# Patient Record
Sex: FEMALE | Race: WHITE | URBAN - METROPOLITAN AREA
[De-identification: names, ages, dates, MRNs, and addresses within clinical notes are randomized per-mention and may not be internally consistent; named-entity substitution may affect disease eponyms.]

---

## 2021-09-30 ENCOUNTER — TRANSCRIBE ORDER (OUTPATIENT)
Dept: SCHEDULING | Age: 31
End: 2021-09-30

## 2021-09-30 DIAGNOSIS — J01.41 ACUTE RECURRENT PANSINUSITIS: Primary | ICD-10-CM

## 2022-03-16 RX ORDER — LEVONORGESTREL 52 MG/1
1 INTRAUTERINE DEVICE INTRAUTERINE ONCE
COMMUNITY
Start: 2020-03-04 | End: 2028-03-04

## 2022-03-18 ENCOUNTER — OFFICE VISIT (OUTPATIENT)
Dept: OBGYN CLINIC | Age: 32
End: 2022-03-18
Payer: COMMERCIAL

## 2022-03-18 VITALS
HEIGHT: 65 IN | BODY MASS INDEX: 27.16 KG/M2 | SYSTOLIC BLOOD PRESSURE: 138 MMHG | WEIGHT: 163 LBS | DIASTOLIC BLOOD PRESSURE: 76 MMHG

## 2022-03-18 DIAGNOSIS — Z20.2 POSSIBLE EXPOSURE TO STD: ICD-10-CM

## 2022-03-18 DIAGNOSIS — R10.32 LLQ PAIN: Primary | ICD-10-CM

## 2022-03-18 PROCEDURE — 99203 OFFICE O/P NEW LOW 30 MIN: CPT | Performed by: OBSTETRICS & GYNECOLOGY

## 2022-03-18 RX ORDER — VALACYCLOVIR HYDROCHLORIDE 1 G/1
1000 TABLET, FILM COATED ORAL 2 TIMES DAILY
Qty: 20 TABLET | Refills: 2 | Status: SHIPPED | OUTPATIENT
Start: 2022-03-18 | End: 2022-03-28

## 2022-03-18 NOTE — PROGRESS NOTES
Problem Visit-Complete    Chief Complaint   Checkup IUD      HPI  Tamiko Quezada is a 33 y.o. female who presents for the evaluation of hormones. Pt complaining of break outs, dull sharp pelvic pain, and mood swings for the past 6 months. Pt also states she can not lose weight and she has been working out and has a healthy diet. Pt wondering if it could be cause by her IUD. Has had for 1 year. On her 2nd IUD. Feels like some dull pain on left side. Seems to be mid cycle. Can't feel strings. Worried it could be migrating. Also thinks had coldsores got from          No LMP recorded. There is no problem list on file for this patient. Past Medical History:   Diagnosis Date    Celiac disease     High grade squamous intraepithelial lesion (HGSIL), grade 3 FABI, on biopsy of cervix      Past Surgical History:   Procedure Laterality Date    CKC, AKA COLD KNIFE CONE  2018    Harmonic Scalpel     OB History    Para Term  AB Living   1 0 0 0 1 0   SAB IAB Ectopic Molar Multiple Live Births   0 1 0 0 0 0      # Outcome Date GA Lbr Golden/2nd Weight Sex Delivery Anes PTL Lv   1 IAB              Gyn Flowsheet:  GYN HISTORY 3/16/2022   Pap Date 10/14/2021   Pap Results WNL Neg Pap     Social History     Socioeconomic History    Marital status:    Tobacco Use    Smoking status: Light Tobacco Smoker    Smokeless tobacco: Never Used   Substance and Sexual Activity    Alcohol use: Yes    Drug use: Never    Sexual activity: Yes     Partners: Male     Birth control/protection: I.U.D. No family history on file. Current Outpatient Medications on File Prior to Visit   Medication Sig Dispense Refill    levonorgestreL (Mirena) 20 mcg/24 hours (7 yrs) 52 mg IUD 1 Device by IntraUTERine route once. Indications: abnormally long or heavy periods       No current facility-administered medications on file prior to visit.      Allergies   Allergen Reactions    Penicillins Rash Review of Systems - History obtained from the patient-negative for:  Constitutional: weight loss, fever, night sweats  HEENT: hearing loss, earache, congestion, snoring, sorethroat  CV: chest pain, palpitations, edema  Resp: cough, shortness of breath, wheezing  Breast: breast lumps, nipple discharge, galactorrhea  GI: change in bowel habits, abdominal pain, black or bloody stools  : frequency, dysuria, hematuria, vaginal discharge  MSK: back pain, joint pain, muscle pain  Skin: itching, rash, hives  Neuro: dizziness, headache, confusion, weakness  Psych: anxiety, depression, change in mood  Heme/lymph: bleeding, bruising, pallor      Objective:  Visit Vitals  /76   Wt 163 lb (73.9 kg)       Physical Exam:     Constitutional  · Appearance: well-nourished, well developed, alert, in no acute distress    HENT  · Head and Face: appears normal    Neck  · Inspection/Palpation: normal appearance, no masses or tenderness  · Lymph Nodes: no lymphadenopathy present  · Thyroid: gland size normal, nontender, no nodules or masses present on palpation    Chest  · Respiratory Effort: breathing unlabored  · Auscultation: normal breath sounds    Cardiovascular  · Heart:  · Auscultation: regular rate and rhythm without murmur    Breasts  · Inspection of Breasts: breasts symmetrical, no skin changes, no discharge present, nipple appearance normal, no skin retraction present  · Palpation of Breasts and Axillae: no masses present on palpation, no breast tenderness  · Axillary Lymph Nodes: no lymphadenopathy present    Gastrointestinal  · Abdominal Examination: abdomen non-tender to palpation, normal bowel sounds, no masses present  · Liver and spleen: no hepatomegaly present, spleen not palpable  · Hernias: no hernias identified    Genitourinary  · External Genitalia: normal appearance for age, no discharge present, no tenderness present, no inflammatory lesions present, no masses present, no atrophy present  · Vagina: normal vaginal vault without central or paravaginal defects, no discharge present, no inflammatory lesions present, no masses present  · Bladder: non-tender to palpation  · Urethra: appears normal  · Cervix: normal   · Uterus: normal size, shape and consistency  · Adnexa: no adnexal tenderness present, no adnexal masses present  · Perineum: perineum within normal limits, no evidence of trauma, no rashes or skin lesions present  · Anus: anus within normal limits, no hemorrhoids present  · Inguinal Lymph Nodes: no lymphadenopathy present    Skin  · General Inspection: no rash, no lesions identified    Neurologic/Psychiatric  · Mental Status:  · Orientation: grossly oriented to person, place and time  · Mood and Affect: mood normal, affect appropriate    Assessment:  LLQ Pain   Normal exam   Multiple food allergies. .    Plan:   Reassured  See . RTO prn if symptoms persist or worsen. Instructions given to pt. Handouts given to pt.

## 2022-03-28 DIAGNOSIS — Z20.2 POSSIBLE EXPOSURE TO STD: ICD-10-CM

## 2022-03-30 PROBLEM — A60.00 HERPES GENITALIS: Status: ACTIVE | Noted: 2022-03-30

## 2022-03-30 LAB
HSV1 IGG SER IA-ACNC: 38.6 INDEX (ref 0–0.9)
HSV2 IGG SER IA-ACNC: 22.9 INDEX (ref 0–0.9)

## 2022-04-01 NOTE — PROGRESS NOTES
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  She didn't set up my chart.  She thought she got this from her  and it confirms she did.  Nothing she needs to do

## 2023-03-23 RX ORDER — VALACYCLOVIR HYDROCHLORIDE 1 G/1
TABLET, FILM COATED ORAL
Qty: 20 TABLET | Refills: 2 | Status: SHIPPED | OUTPATIENT
Start: 2023-03-23

## 2023-05-22 RX ORDER — LEVONORGESTREL 52 MG/1
1 INTRAUTERINE DEVICE INTRAUTERINE ONCE
COMMUNITY
Start: 2020-03-04 | End: 2028-03-04

## 2023-05-22 RX ORDER — VALACYCLOVIR HYDROCHLORIDE 1 G/1
TABLET, FILM COATED ORAL
COMMUNITY
Start: 2023-03-23

## 2023-06-30 ENCOUNTER — OFFICE VISIT (OUTPATIENT)
Age: 33
End: 2023-06-30

## 2023-06-30 VITALS
HEIGHT: 65 IN | DIASTOLIC BLOOD PRESSURE: 76 MMHG | WEIGHT: 156.8 LBS | BODY MASS INDEX: 26.12 KG/M2 | SYSTOLIC BLOOD PRESSURE: 120 MMHG

## 2023-06-30 DIAGNOSIS — Z01.419 WELL WOMAN EXAM WITH ROUTINE GYNECOLOGICAL EXAM: Primary | ICD-10-CM

## 2023-07-05 RX ORDER — VALACYCLOVIR HYDROCHLORIDE 1 G/1
TABLET, FILM COATED ORAL
Status: CANCELLED | OUTPATIENT
Start: 2023-07-05

## 2023-07-05 NOTE — TELEPHONE ENCOUNTER
GM pt-last seen in office 6/30/23 for AE    She's requesting rx for valACYclovir (VALTREX) 1 gram tablet be sent as she has a new pharmacy.      Pt states she's taking meds for suppression    Rx pended for review and MD signature    Pharmacy confirmed

## 2023-10-13 ENCOUNTER — TELEPHONE (OUTPATIENT)
Age: 33
End: 2023-10-13

## 2023-10-13 RX ORDER — VALACYCLOVIR HYDROCHLORIDE 1 G/1
1000 TABLET, FILM COATED ORAL 2 TIMES DAILY
Qty: 20 TABLET | Refills: 2 | Status: CANCELLED | OUTPATIENT
Start: 2023-10-13 | End: 2023-10-23

## 2023-10-13 RX ORDER — VALACYCLOVIR HYDROCHLORIDE 1 G/1
1000 TABLET, FILM COATED ORAL 2 TIMES DAILY
Qty: 20 TABLET | Refills: 2 | Status: SHIPPED | OUTPATIENT
Start: 2023-10-13 | End: 2023-10-23

## 2023-10-13 NOTE — TELEPHONE ENCOUNTER
Two patient identifier used    27year old patient last seen in the office on 6/20/2023 for ae    Patient calling to get a refill of her valACYclovir (VALTREX) 1 g tablet [[id number]]     Order Details  Dose, Route, Frequency: As Directed   Dispense Quantity: --     That she takes for outbreaks    Pharmacy confirmed    Please review pended medication , and refills    Thank you

## 2023-10-13 NOTE — TELEPHONE ENCOUNTER
This nurse attempted to reach the patient and left a detailed message regarding MD sent prescription to her pharmacy

## 2024-12-19 ENCOUNTER — OFFICE VISIT (OUTPATIENT)
Age: 34
End: 2024-12-19
Payer: COMMERCIAL

## 2024-12-19 VITALS
SYSTOLIC BLOOD PRESSURE: 124 MMHG | DIASTOLIC BLOOD PRESSURE: 83 MMHG | RESPIRATION RATE: 16 BRPM | HEART RATE: 82 BPM | HEIGHT: 65 IN | WEIGHT: 167.6 LBS | BODY MASS INDEX: 27.92 KG/M2

## 2024-12-19 DIAGNOSIS — Z01.419 WELL WOMAN EXAM WITH ROUTINE GYNECOLOGICAL EXAM: Primary | ICD-10-CM

## 2024-12-19 PROCEDURE — 99395 PREV VISIT EST AGE 18-39: CPT | Performed by: OBSTETRICS & GYNECOLOGY

## 2024-12-19 PROCEDURE — G8484 FLU IMMUNIZE NO ADMIN: HCPCS | Performed by: OBSTETRICS & GYNECOLOGY

## 2024-12-19 RX ORDER — VALACYCLOVIR HYDROCHLORIDE 1 G/1
1000 TABLET, FILM COATED ORAL 2 TIMES DAILY
Qty: 20 TABLET | Refills: 3 | Status: SHIPPED | OUTPATIENT
Start: 2024-12-19 | End: 2024-12-29

## 2024-12-19 RX ORDER — VALACYCLOVIR HYDROCHLORIDE 1 G/1
1 TABLET, FILM COATED ORAL 2 TIMES DAILY
COMMUNITY
End: 2024-12-19 | Stop reason: SDUPTHER

## 2024-12-19 RX ORDER — CITALOPRAM HYDROBROMIDE 20 MG/1
20 TABLET ORAL DAILY
COMMUNITY

## 2024-12-19 SDOH — ECONOMIC STABILITY: FOOD INSECURITY: WITHIN THE PAST 12 MONTHS, YOU WORRIED THAT YOUR FOOD WOULD RUN OUT BEFORE YOU GOT MONEY TO BUY MORE.: NEVER TRUE

## 2024-12-19 SDOH — ECONOMIC STABILITY: INCOME INSECURITY: HOW HARD IS IT FOR YOU TO PAY FOR THE VERY BASICS LIKE FOOD, HOUSING, MEDICAL CARE, AND HEATING?: NOT HARD AT ALL

## 2024-12-19 SDOH — ECONOMIC STABILITY: FOOD INSECURITY: WITHIN THE PAST 12 MONTHS, THE FOOD YOU BOUGHT JUST DIDN'T LAST AND YOU DIDN'T HAVE MONEY TO GET MORE.: NEVER TRUE

## 2024-12-19 ASSESSMENT — PATIENT HEALTH QUESTIONNAIRE - PHQ9
SUM OF ALL RESPONSES TO PHQ9 QUESTIONS 1 & 2: 0
SUM OF ALL RESPONSES TO PHQ QUESTIONS 1-9: 0
2. FEELING DOWN, DEPRESSED OR HOPELESS: NOT AT ALL
1. LITTLE INTEREST OR PLEASURE IN DOING THINGS: NOT AT ALL
SUM OF ALL RESPONSES TO PHQ QUESTIONS 1-9: 0

## 2024-12-19 NOTE — PROGRESS NOTES
Corine Mejia is a 34 y.o. female returns for an annual exam     Chief Complaint   Patient presents with    Annual Exam       No LMP recorded. (Menstrual status: IUD).  Her periods are light in flow and minimal to none with hormone containing IUS..  She does not have dysmenorrhea.  Problems: no problems  Birth Control: IUD.  Last Pap: normal obtained 6/30/2023.  She does have a history of GABBY 2, 3 or cervical cancer.   With regard to the Gardisil vaccine, she has not received it yet      1. Have you been to the ER, urgent care clinic, or hospitalized since your last visit? Yes    2. Have you seen or consulted any other health care providers outside of the [facility] since your last visit? Yes    Examination chaperoned by KALYANI JIMENEZ CMA.  
exam with routine gynecological exam            Plan:  Counseled re: diet, exercise, healthy lifestyle  Rec screening mammo at 38  Return in about 1 year (around 12/19/2025) for Annual.  Data Unavailable

## 2024-12-28 LAB
CYTOLOGIST CVX/VAG CYTO: NORMAL
CYTOLOGY CVX/VAG DOC CYTO: NORMAL
CYTOLOGY CVX/VAG DOC THIN PREP: NORMAL
DX ICD CODE: NORMAL
HPV GENOTYPE REFLEX: NORMAL
HPV I/H RISK 4 DNA CVX QL PROBE+SIG AMP: NEGATIVE
Lab: NORMAL
OTHER STN SPEC: NORMAL
STAT OF ADQ CVX/VAG CYTO-IMP: NORMAL

## 2025-03-07 ENCOUNTER — PROCEDURE VISIT (OUTPATIENT)
Age: 35
End: 2025-03-07

## 2025-03-07 VITALS
WEIGHT: 167.2 LBS | SYSTOLIC BLOOD PRESSURE: 115 MMHG | BODY MASS INDEX: 27.86 KG/M2 | HEART RATE: 75 BPM | DIASTOLIC BLOOD PRESSURE: 69 MMHG | HEIGHT: 65 IN | RESPIRATION RATE: 16 BRPM

## 2025-03-07 DIAGNOSIS — Z30.433 ENCOUNTER FOR REMOVAL AND REINSERTION OF IUD: Primary | ICD-10-CM

## 2025-03-07 NOTE — PROGRESS NOTES
Corine Mejia is a 34 y.o. female presents for a problem visit.    Chief Complaint   Patient presents with    Procedure     IUD removal and replacement            No LMP recorded. (Menstrual status: IUD).    Birth Control: IUD.    Last Pap: normal obtained 12/19/2024.        1. Have you been to the ER, urgent care clinic, or hospitalized since your last visit? Yes    2. Have you seen or consulted any other health care providers outside of the [facility] since your last visit? No    Device number [id number], expiration date 02/28/2027    Chart reviewed for the following:   BARBARA PERKINS MARGARET PATTIE, CMA, have reviewed the History, Physical and updated the Allergic reactions for Corine Mejia     TIME OUT performed immediately prior to start of procedure:   BARBARA PERKINS MARGARET PATTIE, CMA, have performed the following reviews on Corine Mejia prior to the start of the procedure:            * Patient was identified by name and date of birth   * Agreement on procedure being performed was verified  * Risks and Benefits explained to the patient  * Procedure site verified and marked as necessary  * Patient was positioned for comfort  * Consent was signed and verified     Time: 4:05pm    Date of procedure: 3/7/2025    Procedure performed by:  Heron Conti MD       Provider assisted by: Marly Morelos MA    Patient assisted by: self    How tolerated by patient: tolerated the procedure well with no complications    Post Procedural Pain Scale: 2 - Hurts Little Bit    Comments: none    Examination chaperoned by KALYANI MORELOS CMA.

## 2025-03-07 NOTE — PROGRESS NOTES
IUD REMOVAL  Indications for Removal:  Corine Mejia is a ,  34 y.o. female White (non-) whose No LMP recorded. (Menstrual status: IUD).  . who presents today for IUD removal. Her current IUD was placed 5 yr ago. She has not had problems with the IUD.    She requests removal of the IUD because she has had a lot of spotting.   The IUD removal procedure was discussed with the patient and she had no further questions.     Procedure: The patient was placed in a dorsal lithotomy position and appropriately draped. On bimanual exam the uterus was anterior and normal in size with no tenderness present. A speculum exam was performed and the cervix was visualized. The cervix was prepped with zephiran solution. The IUD string was visualized. Using ring forceps , the string was grasped and the IUD removed intact. The IUD was shown to the patient.     ASSESSMENT   Diagnosis Orders   1. Encounter for removal and reinsertion of IUD  levonorgestrel (MIRENA) IUD 52 mg 1 each        PLAN  Return in about 1 month (around 2025) for IUD Check.

## 2025-03-07 NOTE — PROGRESS NOTES
Mirena IUD INSERTION    Indications:  Corine Mejia is 34 y.o. year old White (non-)  female who presents for insertion of an IUD today.   No LMP recorded. (Menstrual status: IUD).     The risks, benefits and alternatives of IUD insertion were discussed in detail at last visit.  She also has reviewed Mirena information. She has elected to proceed with the insertion today and she states she has no further questions.    Labs  No results found for this or any previous visit (from the past 24 hour(s)).    Procedure:  The pelvic exam revealed normal external genitalia. On bimanual exam the uterus was anteverted and normal in size with no tenderness present.   A speculum was inserted into the vagina and the cervix was visualized.   The cervix was prepped with zephiran solution.   The anterior lip of the cervix was grasped with a single toothed tenaculum.   The uterus was sounded with a Sure Sound to 7 centimeters.   The Mirena IUD was then inserted without difficulty.   The string was cut to 3 centimeters.She experienced a moderate  amount of cramping.     Post Procedure Status:  She tolerated the procedure with mild discomfort.   The patient was observed for 5 minutes after the insertion.   There were no complications.     Patient was discharged in stable condition.    The patient received Mirena lot number [id number], expiration date 2027 .    ASSESSMENT   Diagnosis Orders   1. Encounter for removal and reinsertion of IUD          PLAN  Return in about 1 month (around 2025) for IUD Check.

## 2025-04-18 ENCOUNTER — OFFICE VISIT (OUTPATIENT)
Age: 35
End: 2025-04-18

## 2025-04-18 VITALS
HEIGHT: 65 IN | RESPIRATION RATE: 18 BRPM | DIASTOLIC BLOOD PRESSURE: 63 MMHG | HEART RATE: 93 BPM | SYSTOLIC BLOOD PRESSURE: 112 MMHG | WEIGHT: 164 LBS | BODY MASS INDEX: 27.32 KG/M2

## 2025-04-18 DIAGNOSIS — Z30.431 IUD CHECK UP: Primary | ICD-10-CM

## 2025-04-18 NOTE — PROGRESS NOTES
IUD followup note    This is a follow-up visit for Corine Farrell Jackie is a ,  34 y.o. female White (non-) No LMP recorded. (Menstrual status: IUD)..     She had an Mirena IUD placed on 3/7/2025.      Since the IUD placement, the patient has not had any unusual complaints. She has had some mild non-menstrual bleeding. She denies pain, vaginal discharge or fever.  Denies dyspareunia.     History of Present Illness  The patient presents for evaluation of her intrauterine device (IUD).    No complications with the IUD are reported, including no excessive bleeding. The menstrual cycle following the IUD placement was regular, similar to previous years. No residual pain post-coitus has been experienced, which was a concern in the past.    CONTRACEPTION:  - Type used: IUD  - History: No complications reported    SOCIAL HISTORY  She has a 12-year-old son.       Problem List:  Patient Active Problem List    Diagnosis Date Noted    Herpes genitalis 2022     Past Medical History:   Diagnosis Date    Anxiety     Celiac disease     digestive issues with FODMAP foods    Herpes genitalis 2022    High grade squamous intraepithelial lesion (HGSIL), grade 3 GABBY, on biopsy of cervix 2018    IUD (intrauterine device) in place 2020    Mirena.   Replaced 3/7/25     Past Surgical History:   Procedure Laterality Date    CERVICAL CONIZATION   W/ LASER  2018    Harmonic Scalpel    INDUCED          OB History    Para Term  AB Living   1 0 0 0 1 0   SAB IAB Ectopic Molar Multiple Live Births   0 1 0 0 0 0      # Outcome Date GA Lbr [place]/2nd Weight Sex Type Anes PTL Lv   1 IAB              Social History     Socioeconomic History    Marital status:     Number of children: 0   Tobacco Use    Smoking status: Light Smoker     Types: Cigarettes    Smokeless tobacco: Never   Vaping Use    Vaping status: Former   Substance and Sexual Activity    Alcohol use: Yes    Drug use:

## 2025-04-18 NOTE — PROGRESS NOTES
Corine Mejia is a 34 y.o. female presents for a problem visit.    Chief Complaint   Patient presents with    Follow-up     IUD     No LMP recorded. (Menstrual status: IUD).  Birth Control: IUD.  Last Pap: normal obtained 12/19/2024.    The patient is reporting having: IUD Follow Up inserted on 3/7/2025.      1. Have you been to the ER, urgent care clinic, or hospitalized since your last visit? No    2. Have you seen or consulted any other health care providers outside of the [facility] since your last visit? No    Examination chaperoned by KALYANI JIMENEZ CMA.